# Patient Record
(demographics unavailable — no encounter records)

---

## 2025-02-20 NOTE — PLAN
[TextEntry] :  Reviewed imaging findings and pathology discussed results with patient. Discussed diagnosis of L 12:00 IDC AND L 2:00 DCIS receptors pending and need for surgical excision. Discussed adjuvant treatments of chemotherapy, radiation therapy, and antihormonal therapy. Discussed surgical options of L 2 SITE nloc lumpx & SNLB vs TM. Discussed the indication for additional surgical excision depending upon the final surgical pathology. Discussed the benefits and the risks of the surgery not limited to anesthesia risks, bleeding, skin breakdown, infection, and numbness of the skin.  We discussed finding on mammogram and right breast and recommendation for a right stereotactic biopsy which has not been performed yet.  Patient is to get MRI to evaluate extent of disease.  We reviewed patient's personal history of breast cancer and family history and discussed genetic testing.  Patient agrees and will see genetic counselor today.  Slide review to be sent ([facility]). Imaging review ([facility]). Patient will need medical clearance prior to surgery (hx of ?). Plan for L nloc lumpectomy X2 & SNLB, pending results of right stereotactic biopsy, MRI and genetic testing.             L-dex measurement obtained in the office today. The score is within normal limits. Bioimpedance spectroscopy helps identify the onset of lymphedema in an arm or leg before patients experience noticeable swelling. Research has shown that the early detection of lymphedema using L-Dex combined with treatment can reduce progression to chronic lymphedema by 95% in breast cancer patients. Whenever possible, patients are tested for baseline L-Dex score before cancer treatment begins and then are reassessed during regular follow-up visits using the SOZO device. Otherwise, this can be started postoperatively and continued during regular follow-up visits. If the patients L-Dex score increases above normal levels, that is a sign that lymphedema is developing and a referral is made to physical therapy for further evaluation and early compression treatment.

## 2025-02-20 NOTE — PLAN
[TextEntry] :  Reviewed imaging findings and pathology discussed results with patient. Discussed diagnosis of L 12:00 IDC AND L 2:00 DCIS receptors pending and need for surgical excision. Discussed adjuvant treatments of chemotherapy, radiation therapy, and antihormonal therapy. Discussed surgical options of L 2 SITE nloc lumpx & SNLB vs TM. Discussed the indication for additional surgical excision depending upon the final surgical pathology. Discussed the benefits and the risks of the surgery not limited to anesthesia risks, bleeding, skin breakdown, infection, and numbness of the skin.  We discussed finding on mammogram and right breast and recommendation for a right stereotactic biopsy which has not been performed yet.  Patient is to get MRI to evaluate extent of disease.  We reviewed patient's personal history of breast cancer and family history and discussed genetic testing.  Patient agrees and will see genetic counselor today.  Slide review to be sent ([facility]). Imaging review ([facility]). Patient will need medical clearance prior to surgery (hx of ?). Plan for L nloc lumpectomy X2 & SNLB, pending results of right stereotactic biopsy, MRI and genetic testing.             L-dex measurement obtained in the office today. The score is within normal limits. Bioimpedance spectroscopy helps identify the onset of lymphedema in an arm or leg before patients experience noticeable swelling. Research has shown that the early detection of lymphedema using L-Dex combined with treatment can reduce progression to chronic lymphedema by 95% in breast cancer patients. Whenever possible, patients are tested for baseline L-Dex score before cancer treatment begins and then are reassessed during regular follow-up visits using the SOZO device. Otherwise, this can be started postoperatively and continued during regular follow-up visits. If the patients L-Dex score increases above normal levels, that is a sign that lymphedema is developing and a referral is made to physical therapy for further evaluation and early compression treatment.

## 2025-02-20 NOTE — HISTORY OF PRESENT ILLNESS
[FreeTextEntry1] :  Patient is a 58yo F who presents today for initial evaluation of L 12:00 IDC and L 2:00 DCIS (suspicious for microinvasion) IHC pendind  Patient went for annual imaging and was recommended for 1 R stereo bx, 2 L us bx, and 1 R us bx: 1. RUOQ area of architectural distortion noted, for which stereotactic biopsy recommended, 2. R 9:00 4 CFN indeterminant subcentimeter hypoechoic mass, likely corresponds to indistinct subcentimeter mass, for which R us biopsy recommended 3. L 1 cm 12:00 5 CFN irregular hypoechoic mass, associated with architectural distortion noted, for which us bx recommended 4. L 2:00 3 CFN circumscribed hypoechoic subcentimeter mass noted, for which L us bx recommended. Biopsies yielding L 12:00 IDC receptors pending, L 2:00 DCIS (suspicious for microinvasion) receptors pending, R 9:00 FA. Of note, RUOQ stereo biopsy never performed. She was referred by cody diggs. Paternal aunt breast cancer late 50s.  Denies family history of ovarian cancer.  Patient denies palpable masses, skin changes, or nipple discharge bilaterally.  pT1aN0  1/17/2025 B/L MG and US ([facility])-scattered fibroglandular.   R 9:00 mass, may randolph to subcentimeter hyperechoic mass noted at R 9:00 4 CFN on US (rec diagnostic MG/US).  R11 to 12:00 focal asymmetry with questioned assoc architectural distortion (rec diagnostic MG/US).  L 1:00 nodular focal asymmetry vs mass with questioned assoc architectural distortion (rec diagnostic MG/US).  US-R 9:00 4 CFN indeterminant subcentimeter hypoechoic mass.  L 2:00 3 CFN subcentimeter circumscribed hypoechoic mass (rec L US).  B/L US of axillary regions unremarkable.BI-RADS 0 1/31/2025 B/L dMG and US ([facility])-scattered fibroglandular.  R 8 to 9:00 indistinct subcentimeter mass.  RUOQ architectural distortion.  (Rec stereo BX) L 12:00 indistinct mass with associated architectural distortion.  US-R 9:00 4 CFN indeterminant subcentimeter hypoechoic mass, likely corresponds to indistinct subcentimeter mass noted R 8 to 9:00 on MG. (Rec US BX) L 1 cm 12:00 5 CFN irregular hypoechoic mass, associated with architectural distortion noted L 12:00 on MG.  (Rec US BX) L 2:00 3 CFN circumscribed hypoechoic subcentimeter mass.  (Rec US BX) survey of L axilla unremarkable.  BI-RADS 4 2/13/2025 ([place])-R US BX, L US BX, L US BX Site 1: R 9:00 US BX (wing)-FA Site 2: L 12:00 US BX (coil)-IDC, poorly differentiated, 0.54 cm.  Receptors pending Site 3: L 2:00 US BX (ribbon)-DCIS, intermediate nuclear grade, suspicious for microinvasion in a background of fibroadenomatoid changes.  Receptors pending Rec surgical/oncologic management.  As noted from report 1/31/2025, R stereo BX was recommended for evaluation of architectural distortion at RUOQ.  BI-RADS 6

## 2025-02-20 NOTE — PHYSICAL EXAM
[Normocephalic] : normocephalic [EOMI] : extra ocular movement intact [Supple] : supple [No Supraclavicular Adenopathy] : no supraclavicular adenopathy [No Cervical Adenopathy] : no cervical adenopathy [de-identified] :  Bilateral breast/axilla/supraclavicular area: No masses, discharge, or adenopathy

## 2025-02-20 NOTE — PAST MEDICAL HISTORY
[Postmenopausal] : The patient is postmenopausal [Menopause Age____] : age at menopause was [unfilled] [Excessive Bleeding] : there was excessive bleeding [Total Preg ___] : G[unfilled] [Abortions ___] : Abortions:[unfilled] [Menarche Age ____] : age at menarche was [unfilled] [History of Hormone Replacement Treatment] : has no history of hormone replacement treatment [FreeTextEntry3] : patient has fiboids [FreeTextEntry5] : No  [FreeTextEntry6] : No  [FreeTextEntry7] : No  [FreeTextEntry8] : No

## 2025-02-20 NOTE — PHYSICAL EXAM
[Normocephalic] : normocephalic [EOMI] : extra ocular movement intact [Supple] : supple [No Supraclavicular Adenopathy] : no supraclavicular adenopathy [No Cervical Adenopathy] : no cervical adenopathy [de-identified] :  Bilateral breast/axilla/supraclavicular area: No masses, discharge, or adenopathy

## 2025-02-20 NOTE — DISCUSSION/SUMMARY
[FreeTextEntry1] : REASON FOR CONSULT: Christ Kirby is a 57-year-old female referred by Dr. Farhana Weems for cancer genetic counseling and risk assessment due to a new diagnosis of breast cancer. Ms. Kirby was seen on 2025 at which time medical and family history was ascertained and a pedigree constructed. She was accompanied by her wife.  RELEVANT MEDICAL HISTORY: Ms. Kirby was diagnosed with a left breast cancer recently at the age of 57. Pathology report revealed invasive ductal carcinoma (hormone receptors pending) with associated DCIS. Patient is to undergo additional right breast biopsy and MRI prior to treatment planning.    OTHER MEDICAL AND SURGICAL HISTORY: -	Medical History: HLD, glaucoma  -	Surgical History: eye surgery as a child, lens implant  OB/GYN HISTORY: Obstetrical History:  Age at Menarche: 13 Menopausal Status: Post-menopausal with LMP at age 54  Age at First Live Birth: N/A Oral Contraceptive Use: Yes, 4-5 years total  Hormone Replacement Therapy: No  CANCER SCREENING HISTORY:   Breast:  -	Mammography/sonography: 2025 B/L dMG and US ([facility])-scattered fibroglandular. R 8 to 9:00 indistinct subcentimeter mass. RUOQ architectural distortion. (Rec stereo BX) L 12:00 indistinct mass with associated architectural distortion. US-R 9:00 4 CFN indeterminant subcentimeter hypoechoic mass, likely corresponds to indistinct subcentimeter mass noted R 8 to 9:00 on MG. (Rec US BX) L 1 cm 12:00 5 CFN irregular hypoechoic mass, associated with architectural distortion noted L 12:00 on MG. (Rec US BX) L 2:00 3 CFN circumscribed hypoechoic subcentimeter mass. (Rec US BX) survey of L axilla unremarkable. BI-RADS 4 -	MRI: getting scheduled  -	Biopsies: 25- right FA, left IDC and DCIS GYN: -	Pelvic Examination: Annual- reportedly wnl, history of fibroids Colon: -	Colonoscopy: 6-7 years ago- reported no history of polyps, repeat in 5-10 years Skin:   -	FBSE: No -	Lesions biopsied/removed: No  SOCIAL HISTORY: -	Tobacco-product use: No -	Environmental exposures: No   FAMILY HISTORY: Maternal and paternal ancestry was reported as black/. Ashkenazi Latter-day ancestry was denied. A detailed family history of cancer was ascertained, see below and scanned chart for pedigree.   According to Ms. Kirby no one in the family has had germline testing for cancer susceptibility. Consanguinity was denied.  	 RISK ASSESSMENT: Ms. Kirby's personal and family history is not highly suspicious of a hereditary cancer syndrome however given her new diagnosis we discussed genetic testing can be considered to determine appropriate breast management. Therefore, we recommended genetic testing using [employer] [place] [employer] with [employer] panel and included STAT processing for high-risk breast genes. This test analyzes 48 genes: LISA, APC, AXIN2, BAP1, BARD1, BMPR1A, BRCA1, BRCA2, BRIP1, CDH1, CDK4, CDKN2A, CHEK2, CTNNA1, EGFR, EPCAM, FH, FLCN, GREM1, HOXB13, MEN1, MET, MITF, MLH1, MSH2, MSH3, MSH6, MUTYH, NF1, NTLH1, PALB2, POLD1, POLE, PMS2, PTEN, RAD51C, RAD51D, RET, SDHA, SDHB, SDHC, SDHD SMAD4, STK11, TERT, TSC1, TSC2, TP53 and VHL.  The risks, benefits and limitations of genetic testing were discussed with Ms. Kirby. In addition, we discussed the purpose of genetic testing and possible test results (positive, negative, inconclusive) along with associated medical management options and psychosocial implications. Insurance coverage and potential out of pocket costs were also discussed.   It was explained that risk assessment is based upon medical and family history as provided and may change in the future should new information be obtained.   Following our discussion, Ms. Kirby consented to the above-mentioned genetic testing panel. Blood was drawn in our laboratory and sent to [facility] today.  PLAN:  1.	Blood drawn today will be sent to [facility] for analysis.  2.	We will contact Ms. Kirby to schedule a follow-up appointment once the results are available. Results from the STAT panel generally return in 7-10 days.   For any additional questions please call Cancer Genetics at [phone].    Hayley Cuenca MS, [facility] Genetic Counselor, Cancer Genetics

## 2025-02-20 NOTE — FAMILY HISTORY
[TextEntry] : Paternal aunt had breast cancer in her 50s Maternal 2 cousin had pancreatic cancer in her 40s

## 2025-02-20 NOTE — HISTORY OF PRESENT ILLNESS
[FreeTextEntry1] :  Patient is a 58yo F who presents today for initial evaluation of L 12:00 IDC and L 2:00 DCIS (suspicious for microinvasion) IHC pendind  Patient went for annual imaging and was recommended for 1 R stereo bx, 2 L us bx, and 1 R us bx: 1. RUOQ area of architectural distortion noted, for which stereotactic biopsy recommended, 2. R 9:00 4 CFN indeterminant subcentimeter hypoechoic mass, likely corresponds to indistinct subcentimeter mass, for which R us biopsy recommended 3. L 1 cm 12:00 5 CFN irregular hypoechoic mass, associated with architectural distortion noted, for which us bx recommended 4. L 2:00 3 CFN circumscribed hypoechoic subcentimeter mass noted, for which L us bx recommended. Biopsies yielding L 12:00 IDC receptors pending, L 2:00 DCIS (suspicious for microinvasion) receptors pending, R 9:00 FA. Of note, RUOQ stereo biopsy never performed. She was referred by cody diggs. Paternal aunt breast cancer late 50s.  Denies family history of ovarian cancer.  Patient denies palpable masses, skin changes, or nipple discharge bilaterally.  pT1aN0  1/17/2025 B/L MG and US ([facility])-scattered fibroglandular.   R 9:00 mass, may randolph to subcentimeter hyperechoic mass noted at R 9:00 4 CFN on US (rec diagnostic MG/US).  R11 to 12:00 focal asymmetry with questioned assoc architectural distortion (rec diagnostic MG/US).  L 1:00 nodular focal asymmetry vs mass with questioned assoc architectural distortion (rec diagnostic MG/US).  US-R 9:00 4 CFN indeterminant subcentimeter hypoechoic mass.  L 2:00 3 CFN subcentimeter circumscribed hypoechoic mass (rec L US).  B/L US of axillary regions unremarkable.BI-RADS 0 1/31/2025 B/L dMG and US ([facility])-scattered fibroglandular.  R 8 to 9:00 indistinct subcentimeter mass.  RUOQ architectural distortion.  (Rec stereo BX) L 12:00 indistinct mass with associated architectural distortion.  US-R 9:00 4 CFN indeterminant subcentimeter hypoechoic mass, likely corresponds to indistinct subcentimeter mass noted R 8 to 9:00 on MG. (Rec US BX) L 1 cm 12:00 5 CFN irregular hypoechoic mass, associated with architectural distortion noted L 12:00 on MG.  (Rec US BX) L 2:00 3 CFN circumscribed hypoechoic subcentimeter mass.  (Rec US BX) survey of L axilla unremarkable.  BI-RADS 4 2/13/2025 ([place])-R US BX, L US BX, L US BX Site 1: R 9:00 US BX (wing)-FA Site 2: L 12:00 US BX (coil)-IDC, poorly differentiated, 0.54 cm.  Receptors pending Site 3: L 2:00 US BX (ribbon)-DCIS, intermediate nuclear grade, suspicious for microinvasion in a background of fibroadenomatoid changes.  Receptors pending Rec surgical/oncologic management.  As noted from report 1/31/2025, R stereo BX was recommended for evaluation of architectural distortion at RUOQ.  BI-RADS 6

## 2025-02-20 NOTE — PHYSICAL EXAM
[Normocephalic] : normocephalic [EOMI] : extra ocular movement intact [Supple] : supple [No Supraclavicular Adenopathy] : no supraclavicular adenopathy [No Cervical Adenopathy] : no cervical adenopathy [de-identified] :  Bilateral breast/axilla/supraclavicular area: No masses, discharge, or adenopathy

## 2025-02-20 NOTE — PLAN
[TextEntry] :  Reviewed imaging findings and pathology discussed results with patient. Discussed diagnosis of L 12:00 IDC AND L 2:00 DCIS receptors pending and need for surgical excision. Discussed adjuvant treatments of chemotherapy, radiation therapy, and antihormonal therapy. Discussed surgical options of L 2 SITE nloc lumpx & SNLB vs TM. Discussed the indication for additional surgical excision depending upon the final surgical pathology. Discussed the benefits and the risks of the surgery not limited to anesthesia risks, bleeding, skin breakdown, infection, and numbness of the skin.  We discussed finding on mammogram and right breast and recommendation for a right stereotactic biopsy which has not been performed yet.  Patient is to get MRI to evaluate extent of disease.  We reviewed patient's personal history of breast cancer and family history and discussed genetic testing.  Patient agrees and will see genetic counselor today.  Slide review to be sent ([facility]). Imaging review ([facility]). Patient will need medical clearance prior to surgery (hx of ?). Plan for L nloc lumpectomy X2 & SNLB, pending results of right stereotactic biopsy, MRI and genetic testing.             L-dex measurement obtained in the office today. The score is within normal limits. Bioimpedance spectroscopy helps identify the onset of lymphedema in an arm or leg before patients experience noticeable swelling. Research has shown that the early detection of lymphedema using L-Dex combined with treatment can reduce progression to chronic lymphedema by 95% in breast cancer patients. Whenever possible, patients are tested for baseline L-Dex score before cancer treatment begins and then are reassessed during regular follow-up visits using the SOZO device. Otherwise, this can be started postoperatively and continued during regular follow-up visits. If the patients L-Dex score increases above normal levels, that is a sign that lymphedema is developing and a referral is made to physical therapy for further evaluation and early compression treatment.

## 2025-02-20 NOTE — HISTORY OF PRESENT ILLNESS
[FreeTextEntry1] :  Patient is a 58yo F who presents today for initial evaluation of L 12:00 IDC and L 2:00 DCIS (suspicious for microinvasion) IHC pendind  Patient went for annual imaging and was recommended for 1 R stereo bx, 2 L us bx, and 1 R us bx: 1. RUOQ area of architectural distortion noted, for which stereotactic biopsy recommended, 2. R 9:00 4 CFN indeterminant subcentimeter hypoechoic mass, likely corresponds to indistinct subcentimeter mass, for which R us biopsy recommended 3. L 1 cm 12:00 5 CFN irregular hypoechoic mass, associated with architectural distortion noted, for which us bx recommended 4. L 2:00 3 CFN circumscribed hypoechoic subcentimeter mass noted, for which L us bx recommended. Biopsies yielding L 12:00 IDC receptors pending, L 2:00 DCIS (suspicious for microinvasion) receptors pending, R 9:00 FA. Of note, RUOQ stereo biopsy never performed. She was referred by cody diggs. Paternal aunt breast cancer late 50s.  Denies family history of ovarian cancer.  Patient denies palpable masses, skin changes, or nipple discharge bilaterally.  pT1aN0  1/17/2025 B/L MG and US ([facility])-scattered fibroglandular.   R 9:00 mass, may randolph to subcentimeter hyperechoic mass noted at R 9:00 4 CFN on US (rec diagnostic MG/US).  R11 to 12:00 focal asymmetry with questioned assoc architectural distortion (rec diagnostic MG/US).  L 1:00 nodular focal asymmetry vs mass with questioned assoc architectural distortion (rec diagnostic MG/US).  US-R 9:00 4 CFN indeterminant subcentimeter hypoechoic mass.  L 2:00 3 CFN subcentimeter circumscribed hypoechoic mass (rec L US).  B/L US of axillary regions unremarkable.BI-RADS 0 1/31/2025 B/L dMG and US ([facility])-scattered fibroglandular.  R 8 to 9:00 indistinct subcentimeter mass.  RUOQ architectural distortion.  (Rec stereo BX) L 12:00 indistinct mass with associated architectural distortion.  US-R 9:00 4 CFN indeterminant subcentimeter hypoechoic mass, likely corresponds to indistinct subcentimeter mass noted R 8 to 9:00 on MG. (Rec US BX) L 1 cm 12:00 5 CFN irregular hypoechoic mass, associated with architectural distortion noted L 12:00 on MG.  (Rec US BX) L 2:00 3 CFN circumscribed hypoechoic subcentimeter mass.  (Rec US BX) survey of L axilla unremarkable.  BI-RADS 4 2/13/2025 ([place])-R US BX, L US BX, L US BX Site 1: R 9:00 US BX (wing)-FA Site 2: L 12:00 US BX (coil)-IDC, poorly differentiated, 0.54 cm.  Receptors pending Site 3: L 2:00 US BX (ribbon)-DCIS, intermediate nuclear grade, suspicious for microinvasion in a background of fibroadenomatoid changes.  Receptors pending Rec surgical/oncologic management.  As noted from report 1/31/2025, R stereo BX was recommended for evaluation of architectural distortion at RUOQ.  BI-RADS 6

## 2025-03-04 NOTE — DISCUSSION/SUMMARY
[FreeTextEntry1] : RESULTS TRANSMISSION:   Christ Kirby is a 57-year-old female who was contacted on March 4, 2025 for a discussion regarding her negative genetic testing results related to hereditary cancer predisposition. This session was conducted via telephone.   Ms. Kirby was originally seen by the Cancer Genetics Service on February 20, 2025 for hereditary cancer predisposition risk assessment due to a new diagnosis of breast cancer. At that time, Ms. Kirby decided to pursue genetic testing using [employer]'s [employer] panel.  TEST RESULTS: NEGATIVE NO pathogenic (disease-causing) variants or variants of uncertain significance were detected in any of the following genes [48]:  LISA, APC, AXIN2, BAP1, BARD1, BMPR1A, BRCA1, BRCA2, BRIP1, CDH1, CDK4, CDKN2A, CHEK2, CTNNA1, EGFR, EPCAM, FH, FLCN, GREM1, HOXB13, MEN1, MET, MITF, MLH1, MSH2, MSH3, MSH6, MUTYH, NF1, NTLH1, PALB2, POLD1, POLE, PMS2, PTEN, RAD51C, RAD51D, RET, SDHA, SDHB, SDHC, SDHD SMAD4, STK11, TERT, TSC1, TSC2, TP53 and VHL.  RESULTS INTERPRETATION AND ASSESSMENT: Given Ms. Kirby's personal and current reported family history of cancer, and her negative genetic test results, the following screening guidelines and risk-reducing recommendations were discussed:  BREAST:  -	It was discussed that these negative genetic testing result should not impact patient's surgical or treatment planning. -	Long-term management and surveillance should be based on Ms. Kirby's on- or post-treatment protocol as recommended by her breast care team.   OTHER: -	In the absence of other indications, Ms. Kirby should practice age-appropriate cancer screening of other organ systems as recommended for the general population.  We also discussed that, while no cause of the patient's personal and family history of cancer was identified, this result, while reassuring, does entirely not rule out a hereditary cancer risk in the patient. It is possible, although unlikely, the patient has a mutation in one of the genes tested that is not detectable by this analysis, or has a mutation in a different gene, either known or unknown. It is also possible there is a hereditary cancer predisposition in the family, but the patient did not inherit it.   We informed Ms. Kirby that our knowledge of genetics and inherited cancer conditions is changing rapidly. Therefore, we recommended that Ms. Kirby contact our office, every 2 to 3 years, to discuss relevant advances in cancer genetics.  We emphasized the importance of re-contacting us with updates regarding her personal and family history of cancer as well as any updates regarding additional cancer genetic test results performed for the patient and/or family members.  Such updates could possibly change our risk assessment and recommendations.   PLAN: 1.	These results do not change Ms. Kirby's medical management. Long-term management and surveillance should be based on the patient's on- or post-treatment protocol as recommended by her breast care team (and general population guidelines for other cancers). 2.	Patient informed consult note(s) will be available through their [employer] patient portal and genetic test results will be released via [employer] portal. 3.	Ms. Kirby was encouraged to contact us every 2-3 years to discuss relevant advances in cancer genetics, or sooner if there are any changes in her personal or family history of cancer.   For any additional questions please call Cancer Genetics at [phone].    Hayley Cuenca MS, [facility] [place] [facility] Counselor, Cancer Genetics